# Patient Record
Sex: FEMALE | Race: WHITE | NOT HISPANIC OR LATINO | URBAN - METROPOLITAN AREA
[De-identification: names, ages, dates, MRNs, and addresses within clinical notes are randomized per-mention and may not be internally consistent; named-entity substitution may affect disease eponyms.]

---

## 2017-01-31 ENCOUNTER — APPOINTMENT (RX ONLY)
Dept: URBAN - METROPOLITAN AREA CLINIC 347 | Facility: CLINIC | Age: 27
Setting detail: DERMATOLOGY
End: 2017-01-31

## 2017-01-31 DIAGNOSIS — L70.0 ACNE VULGARIS: ICD-10-CM | Status: UNCHANGED

## 2017-01-31 PROCEDURE — ? TREATMENT REGIMEN

## 2017-01-31 PROCEDURE — ? PRESCRIPTION

## 2017-01-31 PROCEDURE — ? COUNSELING

## 2017-01-31 PROCEDURE — 99212 OFFICE O/P EST SF 10 MIN: CPT

## 2017-01-31 RX ORDER — IVERMECTIN 10 MG/G
CREAM TOPICAL
Qty: 1 | Refills: 3 | Status: ERX | COMMUNITY
Start: 2017-01-31

## 2017-01-31 RX ADMIN — IVERMECTIN: 10 CREAM TOPICAL at 20:07

## 2017-01-31 ASSESSMENT — LOCATION ZONE DERM: LOCATION ZONE: FACE

## 2017-01-31 ASSESSMENT — LOCATION SIMPLE DESCRIPTION DERM: LOCATION SIMPLE: LEFT CHEEK

## 2017-01-31 ASSESSMENT — LOCATION DETAILED DESCRIPTION DERM: LOCATION DETAILED: LEFT INFERIOR CENTRAL MALAR CHEEK

## 2017-01-31 NOTE — PROCEDURE: TREATMENT REGIMEN
Otc Regimen: Gentle cleansers.\\nCeraVe PM moisturizer, as needed for dryness, after Cebatrol pads or Soolantra (if more lightweight moisturizer than Cicalfate is desired).\\nSkin medica everyday clear to full face in AM
Detail Level: Simple
Initiate Treatment: Cebatrol pads - once daily or every other day, as tolerated, at bedtime after cleansing.  Can increase up to BID as tolerated.\\nSoolantra - apply pea sized amount to full face once daily, after cleansing or after Cebatrol pads
Continue Regimen: Cicalfate - as needed for dryness (PRN for nighttime moisture)
Plan: Patient did have improvement with spironolactone, but had to stop because she was feeling ADRs that were similar to a flare of her crohn's colitis.  The sx of the flare dissipated after pt's discontinued the balwinder.  She tried the balwinder for the better part of 4 weeks since the last visit

## 2017-02-28 ENCOUNTER — APPOINTMENT (RX ONLY)
Dept: URBAN - METROPOLITAN AREA CLINIC 347 | Facility: CLINIC | Age: 27
Setting detail: DERMATOLOGY
End: 2017-02-28

## 2017-02-28 DIAGNOSIS — Z41.9 ENCOUNTER FOR PROCEDURE FOR PURPOSES OTHER THAN REMEDYING HEALTH STATE, UNSPECIFIED: ICD-10-CM

## 2017-02-28 DIAGNOSIS — L70.0 ACNE VULGARIS: ICD-10-CM | Status: IMPROVED

## 2017-02-28 PROCEDURE — ? COUNSELING

## 2017-02-28 PROCEDURE — 99212 OFFICE O/P EST SF 10 MIN: CPT

## 2017-02-28 PROCEDURE — ? TREATMENT REGIMEN

## 2017-02-28 PROCEDURE — ? COSMETIC CONSULTATION: BOTULINUM TOXIN

## 2017-02-28 PROCEDURE — ? OTHER

## 2017-02-28 ASSESSMENT — LOCATION DETAILED DESCRIPTION DERM
LOCATION DETAILED: RIGHT MEDIAL MALAR CHEEK
LOCATION DETAILED: RIGHT MEDIAL FOREHEAD
LOCATION DETAILED: LEFT CENTRAL MALAR CHEEK

## 2017-02-28 ASSESSMENT — LOCATION SIMPLE DESCRIPTION DERM
LOCATION SIMPLE: LEFT CHEEK
LOCATION SIMPLE: RIGHT FOREHEAD
LOCATION SIMPLE: RIGHT CHEEK

## 2017-02-28 ASSESSMENT — LOCATION ZONE DERM: LOCATION ZONE: FACE

## 2017-02-28 NOTE — PROCEDURE: TREATMENT REGIMEN
Continue Regimen: Soolantra: apply pea size amount to full face daily.\\nCebatrol pads daily for toning.\\nCerave p.m. For moisturizing.\\nSkin Medica Everyday clear for day
Detail Level: Simple

## 2017-02-28 NOTE — PROCEDURE: OTHER
Note Text (......Xxx Chief Complaint.): This diagnosis correlates with the
Other (Free Text): Referred to [facility] for Hydra[facility] brochure given.  Pt is getting  at the end of this year.
Other (Free Text): No photos could be taken today since the internet was having problems.
Detail Level: Detailed